# Patient Record
Sex: FEMALE | Race: BLACK OR AFRICAN AMERICAN | NOT HISPANIC OR LATINO
[De-identification: names, ages, dates, MRNs, and addresses within clinical notes are randomized per-mention and may not be internally consistent; named-entity substitution may affect disease eponyms.]

---

## 2019-06-05 ENCOUNTER — APPOINTMENT (OUTPATIENT)
Dept: VASCULAR SURGERY | Facility: CLINIC | Age: 79
End: 2019-06-05
Payer: MEDICARE

## 2019-06-05 VITALS — WEIGHT: 161 LBS | TEMPERATURE: 98.2 F | BODY MASS INDEX: 30.4 KG/M2 | HEIGHT: 61 IN

## 2019-06-05 VITALS — HEART RATE: 59 BPM | DIASTOLIC BLOOD PRESSURE: 77 MMHG | SYSTOLIC BLOOD PRESSURE: 132 MMHG

## 2019-06-05 DIAGNOSIS — Z00.00 ENCOUNTER FOR GENERAL ADULT MEDICAL EXAMINATION W/OUT ABNORMAL FINDINGS: ICD-10-CM

## 2019-06-05 DIAGNOSIS — I83.893 VARICOSE VEINS OF BILATERAL LOWER EXTREMITIES WITH OTHER COMPLICATIONS: ICD-10-CM

## 2019-06-05 PROCEDURE — 93970 EXTREMITY STUDY: CPT

## 2019-06-05 PROCEDURE — 99204 OFFICE O/P NEW MOD 45 MIN: CPT

## 2019-06-05 RX ORDER — CHROMIUM 200 MCG
TABLET ORAL
Refills: 0 | Status: ACTIVE | COMMUNITY

## 2019-06-05 RX ORDER — CALCIUM CARBONATE/VITAMIN D3 600 MG-10
TABLET ORAL
Refills: 0 | Status: ACTIVE | COMMUNITY

## 2019-06-05 NOTE — DATA REVIEWED
[FreeTextEntry1] : Outside studies reviewed\par \par 6/5/19 Venous Doppler: Bilateral negative DVT/SVT. Right reflux GSV in prox calf and tribs in calf. Left reflux in SSV from SPJ-distal calf, measuring 4mm at the SPJ and reflux in tribs in calf.

## 2019-06-05 NOTE — HISTORY OF PRESENT ILLNESS
[FreeTextEntry1] : 79 y/o f previous pt of Dr. Parra w/ history of venous insufficiency s/p bilateral EVLT in 2014. She denies claudication, but reports bilateral leg heaviness when stair climbing which improves when she wears compression stockings. She is compliant w/ compression stockings. Denies history of DVT.

## 2019-06-05 NOTE — PHYSICAL EXAM
[2+] : left 2+ [Ankle Swelling (On Exam)] : present [Ankle Swelling On The Right] : mild [] : bilaterally [Alert] : alert [Oriented to Person] : oriented to person [Oriented to Place] : oriented to place [Oriented to Time] : oriented to time [Calm] : calm [Ankle Swelling Bilaterally] : severe [Abdomen Masses] : No abdominal masses [Abdomen Tenderness] : ~T ~M No abdominal tenderness [de-identified] : appears well  [FreeTextEntry1] : Bilateral lower extremities w/ chronic venous stasis changes. No open wounds.  [de-identified] : no palpable mass [de-identified] : RAISAL  [de-identified] : Cooperative

## 2019-06-05 NOTE — ASSESSMENT
[Arterial/Venous Disease] : arterial/venous disease [Other: _____] : [unfilled] [FreeTextEntry1] : 77 y/o f w/ stable chronic venous insufficiency. No interventions recommended at this time \par \par Medical Conservative Management leg elevation, diet and weight loss, skin moisturizer and compression stockings\par She may return to the office on an as needed basis

## 2020-08-19 ENCOUNTER — APPOINTMENT (OUTPATIENT)
Dept: VASCULAR SURGERY | Facility: CLINIC | Age: 80
End: 2020-08-19
Payer: MEDICARE

## 2020-08-19 VITALS
HEIGHT: 61 IN | HEART RATE: 60 BPM | DIASTOLIC BLOOD PRESSURE: 82 MMHG | TEMPERATURE: 98.4 F | BODY MASS INDEX: 27.94 KG/M2 | SYSTOLIC BLOOD PRESSURE: 132 MMHG | WEIGHT: 148 LBS

## 2020-08-19 PROCEDURE — 93970 EXTREMITY STUDY: CPT

## 2020-08-19 PROCEDURE — 99212 OFFICE O/P EST SF 10 MIN: CPT

## 2020-08-19 NOTE — ASSESSMENT
[FreeTextEntry1] : bl LE chronic deep VI\par no indication for surgical interventions\par  LE elevation and compression with 20-30mmHG compression stockings\par f/up prn

## 2020-08-19 NOTE — HISTORY OF PRESENT ILLNESS
[FreeTextEntry1] : 80 yo female with chronic lower extremity venous insufficiency treated with saphenous vein ablation. Calf reflux on prior ultrasound. [de-identified] : 78 yo female presents to the office today with leg heaviness and left foot pain. She was doing well until 2-3 months ago, when she began to feel heaviness while ascending stairs. She denies leg pain and changes in swelling. She wears compression stockings everyday and reports no pain while walking. She has also felt sharp pain in her left lateral foot after several hours of leg elevation that resolves upon sitting upright.

## 2020-08-19 NOTE — PHYSICAL EXAM
[2+] : left 2+ [Ankle Swelling (On Exam)] : present [Ankle Swelling Bilaterally] : bilaterally  [Varicose Veins Of Lower Extremities] : present [Ankle Swelling On The Right] : mild [] : present [Oriented to Person] : oriented to person [Alert] : alert [Oriented to Place] : oriented to place [Oriented to Time] : oriented to time [Calm] : calm [JVD] : no jugular venous distention  [de-identified] : bl LE venous stasis changes [de-identified] : Appears well, no acute distress

## 2021-07-21 ENCOUNTER — APPOINTMENT (OUTPATIENT)
Dept: VASCULAR SURGERY | Facility: CLINIC | Age: 81
End: 2021-07-21
Payer: MEDICARE

## 2021-07-21 VITALS
SYSTOLIC BLOOD PRESSURE: 146 MMHG | HEIGHT: 61 IN | BODY MASS INDEX: 26.43 KG/M2 | DIASTOLIC BLOOD PRESSURE: 78 MMHG | TEMPERATURE: 96.5 F | HEART RATE: 67 BPM | WEIGHT: 140 LBS

## 2021-07-21 PROCEDURE — 93970 EXTREMITY STUDY: CPT

## 2021-07-21 PROCEDURE — 99072 ADDL SUPL MATRL&STAF TM PHE: CPT

## 2021-07-21 PROCEDURE — 99212 OFFICE O/P EST SF 10 MIN: CPT

## 2021-07-21 NOTE — PHYSICAL EXAM
[2+] : left 2+ [Ankle Swelling Bilaterally] : bilaterally  [Varicose Veins Of Lower Extremities] : bilaterally [Ankle Swelling On The Right] : mild [] : bilaterally [Ankle Swelling On The Left] : moderate [Ankle Swelling (On Exam)] : not present [FreeTextEntry1] : N

## 2021-10-14 ENCOUNTER — APPOINTMENT (OUTPATIENT)
Dept: VASCULAR SURGERY | Facility: CLINIC | Age: 81
End: 2021-10-14
Payer: MEDICARE

## 2021-10-14 VITALS
DIASTOLIC BLOOD PRESSURE: 84 MMHG | HEART RATE: 57 BPM | BODY MASS INDEX: 27.94 KG/M2 | WEIGHT: 148 LBS | HEIGHT: 61 IN | SYSTOLIC BLOOD PRESSURE: 143 MMHG | TEMPERATURE: 96 F

## 2021-10-14 DIAGNOSIS — I87.2 VENOUS INSUFFICIENCY (CHRONIC) (PERIPHERAL): ICD-10-CM

## 2021-10-14 DIAGNOSIS — Z80.9 FAMILY HISTORY OF MALIGNANT NEOPLASM, UNSPECIFIED: ICD-10-CM

## 2021-10-14 DIAGNOSIS — Z78.9 OTHER SPECIFIED HEALTH STATUS: ICD-10-CM

## 2021-10-14 PROCEDURE — 99213 OFFICE O/P EST LOW 20 MIN: CPT

## 2021-10-14 NOTE — DATA REVIEWED
[FreeTextEntry1] : 07/2021 VLE/VI study reviewed\par bilateral deep venous reflux\par superficial system small with minimal reflux

## 2021-10-14 NOTE — ASSESSMENT
[FreeTextEntry1] : Problem #1 chronic venous insufficiency\par - recommend to continue compression and elevation\par - encourage to see a dermatologist for skin tightness and dryness\par - follow up as needed

## 2021-10-14 NOTE — HISTORY OF PRESENT ILLNESS
[FreeTextEntry1] : 80 year old female with history of chronic  venous insufficiency.\par Bilateral GSV ablations in 2014 [de-identified] : returns to office with complaints of skin tightness and dry skin in both legs associated with discomfort\par states she wears her compression stockings regularly

## 2021-10-14 NOTE — PHYSICAL EXAM
[Respiratory Effort] : normal respiratory effort [Normal Rate and Rhythm] : normal rate and rhythm [2+] : left 2+ [Ankle Swelling (On Exam)] : not present [Varicose Veins Of Lower Extremities] : not present [] : bilaterally [Ankle Swelling On The Left] : moderate [Abdomen Tenderness] : ~T ~M No abdominal tenderness [Skin Ulcer] : no ulcer [Alert] : alert [Oriented to Person] : oriented to person [Oriented to Place] : oriented to place [Oriented to Time] : oriented to time [Calm] : calm [de-identified] : appears stated age [de-identified] : normocephalic, atraumatic [de-identified] : supple

## 2022-07-20 ENCOUNTER — APPOINTMENT (OUTPATIENT)
Dept: VASCULAR SURGERY | Facility: CLINIC | Age: 82
End: 2022-07-20

## 2022-07-20 VITALS
SYSTOLIC BLOOD PRESSURE: 124 MMHG | HEIGHT: 61 IN | TEMPERATURE: 96.5 F | WEIGHT: 139 LBS | DIASTOLIC BLOOD PRESSURE: 81 MMHG | HEART RATE: 60 BPM | BODY MASS INDEX: 26.24 KG/M2

## 2022-07-20 PROCEDURE — 99212 OFFICE O/P EST SF 10 MIN: CPT

## 2022-07-20 NOTE — ASSESSMENT
[FreeTextEntry1] : 81F with bilat venous insufficiency c4 s/p bilat GSV ablation.  \par \par Recommend Dermatologist evaluation- referral given\par Continue compression stockings use- script given\par Follow up as necessary

## 2022-07-20 NOTE — REVIEW OF SYSTEMS
[Lower Ext Edema] : lower extremity edema [As Noted in HPI] : as noted in HPI [Skin Lesions] : skin lesion [Itching] : itching [Negative] : Heme/Lymph [FreeTextEntry5] : mild

## 2022-07-20 NOTE — HISTORY OF PRESENT ILLNESS
[FreeTextEntry1] : 80 year old female with history of chronic venous insufficiency.\par Bilateral GSV ablations in 2014 [de-identified] : Complains of persistent tightness and itching in legs.  States that Vaseline is the only cream that helps moisturize.  Has not seen a dermatologist.  COmpliant with compression stockings.

## 2022-07-20 NOTE — PHYSICAL EXAM
[2+] : left 2+ [Ankle Swelling (On Exam)] : present [Ankle Swelling On The Right] : mild [Varicose Veins Of Lower Extremities] : bilaterally [Ankle Swelling On The Left] : moderate [] : bilaterally [Ankle Swelling Bilaterally] : severe [No Rash or Lesion] : No rash or lesion [Alert] : alert [Oriented to Person] : oriented to person [Oriented to Place] : oriented to place [Oriented to Time] : oriented to time [Calm] : calm [JVD] : no jugular venous distention  [de-identified] : NAD [de-identified] : NCAT [de-identified] : stasis dermatitis present bilat- no open wounds